# Patient Record
Sex: MALE | Race: WHITE | NOT HISPANIC OR LATINO
[De-identification: names, ages, dates, MRNs, and addresses within clinical notes are randomized per-mention and may not be internally consistent; named-entity substitution may affect disease eponyms.]

---

## 2017-01-31 ENCOUNTER — APPOINTMENT (OUTPATIENT)
Dept: SPEECH THERAPY | Facility: CLINIC | Age: 22
End: 2017-01-31

## 2017-01-31 ENCOUNTER — OUTPATIENT (OUTPATIENT)
Dept: OUTPATIENT SERVICES | Facility: HOSPITAL | Age: 22
LOS: 1 days | Discharge: HOME | End: 2017-01-31

## 2017-02-02 DIAGNOSIS — H90.3 SENSORINEURAL HEARING LOSS, BILATERAL: ICD-10-CM

## 2017-08-22 ENCOUNTER — OUTPATIENT (OUTPATIENT)
Dept: OUTPATIENT SERVICES | Facility: HOSPITAL | Age: 22
LOS: 1 days | Discharge: HOME | End: 2017-08-22

## 2017-08-22 ENCOUNTER — APPOINTMENT (OUTPATIENT)
Dept: SPEECH THERAPY | Facility: CLINIC | Age: 22
End: 2017-08-22

## 2017-08-29 DIAGNOSIS — H90.3 SENSORINEURAL HEARING LOSS, BILATERAL: ICD-10-CM

## 2017-09-20 ENCOUNTER — TRANSCRIPTION ENCOUNTER (OUTPATIENT)
Age: 22
End: 2017-09-20

## 2017-12-12 ENCOUNTER — APPOINTMENT (OUTPATIENT)
Dept: SPEECH THERAPY | Facility: CLINIC | Age: 22
End: 2017-12-12

## 2018-01-10 ENCOUNTER — APPOINTMENT (OUTPATIENT)
Dept: SPEECH THERAPY | Facility: CLINIC | Age: 23
End: 2018-01-10

## 2018-02-13 ENCOUNTER — APPOINTMENT (OUTPATIENT)
Dept: SPEECH THERAPY | Facility: CLINIC | Age: 23
End: 2018-02-13

## 2018-05-15 ENCOUNTER — APPOINTMENT (OUTPATIENT)
Dept: SPEECH THERAPY | Facility: CLINIC | Age: 23
End: 2018-05-15

## 2018-05-15 ENCOUNTER — OUTPATIENT (OUTPATIENT)
Dept: OUTPATIENT SERVICES | Facility: HOSPITAL | Age: 23
LOS: 1 days | Discharge: HOME | End: 2018-05-15

## 2018-05-18 DIAGNOSIS — H90.3 SENSORINEURAL HEARING LOSS, BILATERAL: ICD-10-CM

## 2018-08-14 ENCOUNTER — APPOINTMENT (OUTPATIENT)
Dept: SPEECH THERAPY | Facility: CLINIC | Age: 23
End: 2018-08-14

## 2019-02-12 ENCOUNTER — APPOINTMENT (OUTPATIENT)
Dept: SPEECH THERAPY | Facility: CLINIC | Age: 24
End: 2019-02-12

## 2020-07-29 ENCOUNTER — APPOINTMENT (OUTPATIENT)
Dept: SPEECH THERAPY | Facility: CLINIC | Age: 25
End: 2020-07-29

## 2020-07-29 ENCOUNTER — OUTPATIENT (OUTPATIENT)
Dept: OUTPATIENT SERVICES | Facility: HOSPITAL | Age: 25
LOS: 1 days | Discharge: HOME | End: 2020-07-29

## 2020-08-13 DIAGNOSIS — H90.3 SENSORINEURAL HEARING LOSS, BILATERAL: ICD-10-CM

## 2021-01-19 ENCOUNTER — APPOINTMENT (OUTPATIENT)
Dept: SPEECH THERAPY | Facility: CLINIC | Age: 26
End: 2021-01-19

## 2021-08-09 ENCOUNTER — APPOINTMENT (OUTPATIENT)
Dept: SPEECH THERAPY | Facility: CLINIC | Age: 26
End: 2021-08-09

## 2021-08-09 ENCOUNTER — OUTPATIENT (OUTPATIENT)
Dept: OUTPATIENT SERVICES | Facility: HOSPITAL | Age: 26
LOS: 1 days | Discharge: HOME | End: 2021-08-09

## 2021-08-11 ENCOUNTER — NON-APPOINTMENT (OUTPATIENT)
Age: 26
End: 2021-08-11

## 2021-10-18 DIAGNOSIS — H90.3 SENSORINEURAL HEARING LOSS, BILATERAL: ICD-10-CM

## 2022-02-07 ENCOUNTER — APPOINTMENT (OUTPATIENT)
Dept: SPEECH THERAPY | Facility: CLINIC | Age: 27
End: 2022-02-07

## 2022-02-07 ENCOUNTER — TRANSCRIPTION ENCOUNTER (OUTPATIENT)
Age: 27
End: 2022-02-07

## 2022-03-24 ENCOUNTER — APPOINTMENT (OUTPATIENT)
Dept: OTOLARYNGOLOGY | Facility: CLINIC | Age: 27
End: 2022-03-24
Payer: MEDICARE

## 2022-03-24 VITALS — HEIGHT: 64 IN | WEIGHT: 230 LBS | BODY MASS INDEX: 39.27 KG/M2

## 2022-03-24 VITALS — SYSTOLIC BLOOD PRESSURE: 128 MMHG | HEART RATE: 67 BPM | DIASTOLIC BLOOD PRESSURE: 82 MMHG

## 2022-03-24 DIAGNOSIS — Z78.9 OTHER SPECIFIED HEALTH STATUS: ICD-10-CM

## 2022-03-24 DIAGNOSIS — H90.3 SENSORINEURAL HEARING LOSS, BILATERAL: ICD-10-CM

## 2022-03-24 DIAGNOSIS — J30.2 OTHER SEASONAL ALLERGIC RHINITIS: ICD-10-CM

## 2022-03-24 DIAGNOSIS — Z87.09 PERSONAL HISTORY OF OTHER DISEASES OF THE RESPIRATORY SYSTEM: ICD-10-CM

## 2022-03-24 PROCEDURE — 99203 OFFICE O/P NEW LOW 30 MIN: CPT

## 2022-03-24 PROCEDURE — 92504 EAR MICROSCOPY EXAMINATION: CPT

## 2022-03-24 RX ORDER — MONTELUKAST SODIUM 5 MG/1
5 TABLET, CHEWABLE ORAL
Refills: 0 | Status: ACTIVE | COMMUNITY

## 2022-03-24 NOTE — PROCEDURE
[] : Binocular Microscopy [Other ___] : [unfilled] [Same] : same as the Pre Op Dx. [FreeTextEntry1] : jessa JOSE [FreeTextEntry4] : none [FreeTextEntry6] : Operative microscope was used to examine the ear canal, ear drum, and visible middle ear landmarks. Adequate exam would not have been possible without the use of a microscope. Findings are described.

## 2022-03-24 NOTE — HISTORY OF PRESENT ILLNESS
[de-identified] : 26 year old male here for initial consultation for left cochlear implant upgrade.\par Patient reports having CI since he was 3 years. \par States mapping is "terrible".  \par Reports having difficulty understanding people when they speak \par Reports tinnitus in left ear that resolves when he takes off CI. \par Patient denies otalgia, otorrhea, ear infections, dizziness, vertigo, headaches related to hearing.\par

## 2022-03-24 NOTE — PHYSICAL EXAM
[Rinne Test Air Conduction Persists > Bone Conduction Right] : air conduction greater than bone conduction on the right [Rinne Test Air Conduction Persists > Bone Conduction Left] : air conduction greater than bone conduction on the left [Hearing Hall Test (Tuning Fork On Forehead)] : no lateralization of tone [Midline] : trachea located in midline position [Binocular Microscopic Exam] : Binocular microscopic exam was performed [Normal] : the left mastoid was normal [Hearing Loss Right Only] : normal [Hearing Loss Left Only] : normal [Nystagmus] : ~T no ~M nystagmus was seen [Fukuda Step Test] : Fukuda Step Test was Negative [Romberg's Sign] : Romberg's sign was absent [Fistula Sign] : Fistula Sign: Negative [Past-Pointing] : Past-Pointing: Negative [Nii-Hallbeeke] : [place]-Hallpike: Negative [FreeTextEntry9] : incision healed, skin intact

## 2022-04-04 ENCOUNTER — APPOINTMENT (OUTPATIENT)
Dept: SPEECH THERAPY | Facility: CLINIC | Age: 27
End: 2022-04-04

## 2022-06-01 ENCOUNTER — APPOINTMENT (OUTPATIENT)
Dept: SPEECH THERAPY | Facility: CLINIC | Age: 27
End: 2022-06-01

## 2022-11-23 ENCOUNTER — OUTPATIENT (OUTPATIENT)
Dept: OUTPATIENT SERVICES | Facility: HOSPITAL | Age: 27
LOS: 1 days | Discharge: HOME | End: 2022-11-23

## 2022-11-23 ENCOUNTER — APPOINTMENT (OUTPATIENT)
Dept: SPEECH THERAPY | Facility: CLINIC | Age: 27
End: 2022-11-23

## 2022-11-23 ENCOUNTER — NON-APPOINTMENT (OUTPATIENT)
Age: 27
End: 2022-11-23

## 2022-11-23 NOTE — HISTORY OF PRESENT ILLNESS
[FreeTextEntry1] : Left ear internal N24 cochlear implant with external beige N6 processor with brown coil/cable.   [FreeTextEntry8] : Both of Ciaran's parents are no longer alive and he is now living with a cousin, Gena Rizo, [phone].  Spoke with the cousin who reports Ciaran is not hearing well and she wanted to know when he will be getting an upgrade. Informed her not until April 1, 2023 when the N6 will no longer be supported.  She also reported that Ciaran now has a diagnosis of Autism and is looking for additional support for Ciaran. I referred them to our , Michael Coates.

## 2022-11-23 NOTE — ASSESSMENT
[FreeTextEntry1] : Left ear impedances WNL\par \par SPEAK \par Electrodes 20-1 enabled.\par 250Hz 10 max PW 25\par P1 Checked behavioral T levels. Increased C levels by 2 CU.\par Volume 6\par P2 Same as P1\par \par Able to repeat all Ling sounds and all Spondee words.

## 2022-11-29 ENCOUNTER — NON-APPOINTMENT (OUTPATIENT)
Age: 27
End: 2022-11-29

## 2022-11-30 DIAGNOSIS — H90.3 SENSORINEURAL HEARING LOSS, BILATERAL: ICD-10-CM

## 2023-03-08 ENCOUNTER — APPOINTMENT (OUTPATIENT)
Dept: SPEECH THERAPY | Facility: CLINIC | Age: 28
End: 2023-03-08

## 2023-03-28 ENCOUNTER — APPOINTMENT (OUTPATIENT)
Dept: SPEECH THERAPY | Facility: CLINIC | Age: 28
End: 2023-03-28

## 2023-03-28 NOTE — HISTORY OF PRESENT ILLNESS
[FreeTextEntry1] : Left ear internal N24 cochlear implant with external beige N6 processor and brown coil/cable. [FreeTextEntry8] : Ciaran continues to report that he has trouble following speech around people, not necessarily in noise but is fine one on one. It could be a focus issue, but this is a continuous complaint. \elgin Also wants to do upgrade from N6 to N8

## 2023-08-07 ENCOUNTER — APPOINTMENT (OUTPATIENT)
Dept: SPEECH THERAPY | Facility: CLINIC | Age: 28
End: 2023-08-07

## 2023-09-13 ENCOUNTER — OUTPATIENT (OUTPATIENT)
Dept: OUTPATIENT SERVICES | Facility: HOSPITAL | Age: 28
LOS: 1 days | Discharge: HOME | End: 2023-09-13

## 2023-09-13 ENCOUNTER — APPOINTMENT (OUTPATIENT)
Dept: SPEECH THERAPY | Facility: CLINIC | Age: 28
End: 2023-09-13

## 2023-09-19 DIAGNOSIS — H90.3 SENSORINEURAL HEARING LOSS, BILATERAL: ICD-10-CM

## 2023-10-11 ENCOUNTER — APPOINTMENT (OUTPATIENT)
Dept: SPEECH THERAPY | Facility: CLINIC | Age: 28
End: 2023-10-11

## 2023-11-15 ENCOUNTER — APPOINTMENT (OUTPATIENT)
Dept: SPEECH THERAPY | Facility: CLINIC | Age: 28
End: 2023-11-15

## 2023-11-21 DIAGNOSIS — F84.0 AUTISTIC DISORDER: ICD-10-CM

## 2023-12-13 ENCOUNTER — APPOINTMENT (OUTPATIENT)
Dept: SPEECH THERAPY | Facility: CLINIC | Age: 28
End: 2023-12-13

## 2023-12-14 NOTE — HISTORY OF PRESENT ILLNESS
[FreeTextEntry1] : Left ear internal N24 cochlear implant with external beige N8 processor and brown coil cable with #3M magnet. [FreeTextEntry8] : Ciaran continues to complain of poor speech understanding with new N8 processor.

## 2023-12-14 NOTE — ASSESSMENT
[FreeTextEntry1] : Left ear impedances WNL Data Logging 16.9 hours  SPEAK 250Hz 10 max PW 25 electrodes 20-1 enabled. P1 Dropped T levels by 15 CU live voice-Ciaran reported this improved sound quality. Set accessory mixing ratio 1:1

## 2024-01-24 ENCOUNTER — APPOINTMENT (OUTPATIENT)
Dept: SPEECH THERAPY | Facility: CLINIC | Age: 29
End: 2024-01-24

## 2024-01-29 NOTE — PROCEDURE
[de-identified] : Functional Gain: Left N8 Map 169  Responses to FM tones obtained between 30-40 dB HL. Adequate functional gain 250-6000 Hz. Good SRS score of 80% obtained at 50 dB HL.   Az Bio quiet: 84%  Az Bio +10 SNR: 68% CNC word list: 56% words correct

## 2024-01-29 NOTE — HISTORY OF PRESENT ILLNESS
[FreeTextEntry1] :  Left ear internal N24 cochlear implant with external beige N8 processor and brown coil cable with #3M magnet.   [FreeTextEntry8] : Patient reports continued difficulty with speech understanding with N8 processor.

## 2024-01-29 NOTE — ASSESSMENT
[FreeTextEntry1] : Left ear impedances WNL.   Moved patient from map 169 to previous map 163 when compared speech perception testing with map 169 to map 163 preferred results from map 163.

## 2024-03-06 ENCOUNTER — OUTPATIENT (OUTPATIENT)
Dept: OUTPATIENT SERVICES | Facility: HOSPITAL | Age: 29
LOS: 1 days | Discharge: HOME | End: 2024-03-06

## 2024-03-06 ENCOUNTER — APPOINTMENT (OUTPATIENT)
Dept: SPEECH THERAPY | Facility: CLINIC | Age: 29
End: 2024-03-06

## 2024-03-08 NOTE — ASSESSMENT
[FreeTextEntry1] : Left ear impedances WNL  SPEAK Map with electrodes 20-1 enabled. 250Hz 10 max PW 25 P1 Based on functional gain and live voice for comfort adjusted T and C levels on electrodes 20-14 by increasing 3 CU and lowering volume to 5.  26 P2 Same as P1

## 2024-03-08 NOTE — PROCEDURE
[de-identified] : Functional gain testing with new Map 74 in the left ear revealed adequate functional gain 250Hz-6K with an excellent SRS of 100% at 45dB

## 2024-03-08 NOTE — HISTORY OF PRESENT ILLNESS
[FreeTextEntry1] : Left ear internal N24 cochlear implant with external beige N8 processor, brown coil cable 3M magnet. [FreeTextEntry8] : Under the guardianship of his cousin. Diagnosed with Autism. Reports that the child lock on the N8 processor keeps moving into the locked position and has damaged both of his rechargeable batteries. Will replace the N8 and the batteries under warranty. Still complains that everyone tells him he does not understand speech but I still do not see that problem in the office. It may be a problem in noise.

## 2024-03-14 DIAGNOSIS — H90.3 SENSORINEURAL HEARING LOSS, BILATERAL: ICD-10-CM

## 2024-09-10 ENCOUNTER — APPOINTMENT (OUTPATIENT)
Dept: SPEECH THERAPY | Facility: CLINIC | Age: 29
End: 2024-09-10

## 2024-09-11 NOTE — PROCEDURE
[de-identified] : Left ear functional gain testing with current map 175 and new map 177 reveals adequate functional gain 250Hz-6K. Recorded  CNC words list reveal fair SRS's of 64% and 60%

## 2024-09-11 NOTE — PLAN
[FreeTextEntry2] : 1 Remain on new ACE map for 3-4 weeks and return for continued map optimization and speech perception testing.

## 2024-09-11 NOTE — HISTORY OF PRESENT ILLNESS
[FreeTextEntry1] : Left ear internal N24 cochlear implant with external beige N8 processor and brown 6cm coil cable and 3M magnet.  [FreeTextEntry8] : Continues to report that family members complain that he does not understand speech in quiet or in noise with current map 175

## 2024-09-11 NOTE — ASSESSMENT
[FreeTextEntry1] : Left ear impedances WNL  Current map 175: SPEAK with electrodes 22-1 enabled with a narrow DR of 21-23 Az-Bio at 50dB with +10 s/n ratio revealed 61% sentences correct.  Based on continued complaints and reports created a new map based on Population Mean: ACE 250Hz 8 max PW 25 based on live voice with a DR 46. Patient does not like sound quality=robotic which I assured him will improve with wear time. Able to repeat all spondee words and sentences.  P1-New ACE map P2 Old SPEAK map

## 2024-11-06 ENCOUNTER — OUTPATIENT (OUTPATIENT)
Dept: OUTPATIENT SERVICES | Facility: HOSPITAL | Age: 29
LOS: 1 days | Discharge: HOME | End: 2024-11-06

## 2024-11-06 ENCOUNTER — APPOINTMENT (OUTPATIENT)
Dept: SPEECH THERAPY | Facility: CLINIC | Age: 29
End: 2024-11-06

## 2024-11-11 DIAGNOSIS — H90.3 SENSORINEURAL HEARING LOSS, BILATERAL: ICD-10-CM

## 2025-07-15 NOTE — ASSESSMENT
Problem: Pain  Goal: Acceptable pain level achieved/maintained at rest using appropriate pain scale for the patient  Outcome: Adequate for discharge  Goal: Acceptable pain level achieved/maintained with activity using appropriate pain scale for the patient  Outcome: Adequate for discharge  Goal: Acceptable pain level achieved/maintained without oversedation  Outcome: Adequate for discharge      [FreeTextEntry1] : Left ear impedances WNL\par \par Based on current map 154 SPEAK with electrodes 20-1 enabled:\par \par P1 Dropped T levels and slightly increased C levels for a DR of 46-live voice mapping\par P2 Same as P1\par \par Called Cochlear Reimbursement to place order for upgrade:\par Beige N8 processor with brown coil/cable and #3 magnet\par Mini-mi\par Aqua Plus-Ciaran counseled he can only use the Aqua plus with rechargeable batteries even though he prefers the disposable batteries. Informed him the Aqua plus will not work with the disposable batteries.\par \par Processor will be shipped to [facility] home to sign\par Order # [id number]